# Patient Record
Sex: FEMALE
[De-identification: names, ages, dates, MRNs, and addresses within clinical notes are randomized per-mention and may not be internally consistent; named-entity substitution may affect disease eponyms.]

---

## 2017-06-12 ENCOUNTER — HOSPITAL ENCOUNTER (EMERGENCY)
Dept: HOSPITAL 14 - H.ER | Age: 27
Discharge: HOME | End: 2017-06-12
Payer: COMMERCIAL

## 2017-06-12 VITALS
DIASTOLIC BLOOD PRESSURE: 69 MMHG | OXYGEN SATURATION: 99 % | HEART RATE: 79 BPM | TEMPERATURE: 98.1 F | SYSTOLIC BLOOD PRESSURE: 115 MMHG | RESPIRATION RATE: 16 BRPM

## 2017-06-12 DIAGNOSIS — D64.9: ICD-10-CM

## 2017-06-12 DIAGNOSIS — K21.9: Primary | ICD-10-CM

## 2017-06-12 LAB
ALBUMIN/GLOB SERPL: 1.4 {RATIO} (ref 1–2.1)
ALP SERPL-CCNC: 64 U/L (ref 38–126)
ALT SERPL-CCNC: 33 U/L (ref 9–52)
APTT BLD: 27.8 SECONDS (ref 25.6–37.1)
AST SERPL-CCNC: 29 U/L (ref 14–36)
BASOPHILS # BLD AUTO: 0 K/UL (ref 0–0.2)
BASOPHILS NFR BLD: 0.6 % (ref 0–2)
BILIRUB SERPL-MCNC: 0.2 MG/DL (ref 0.2–1.3)
BILIRUB UR-MCNC: NEGATIVE MG/DL
BUN SERPL-MCNC: 10 MG/DL (ref 7–17)
CALCIUM SERPL-MCNC: 9.4 MG/DL (ref 8.4–10.2)
CHLORIDE SERPL-SCNC: 104 MMOL/L (ref 98–107)
CO2 SERPL-SCNC: 27 MMOL/L (ref 22–30)
COLOR UR: YELLOW
EOSINOPHIL # BLD AUTO: 0.2 K/UL (ref 0–0.7)
EOSINOPHIL NFR BLD: 2.9 % (ref 0–4)
ERYTHROCYTE [DISTWIDTH] IN BLOOD BY AUTOMATED COUNT: 13.7 % (ref 11.5–14.5)
GLOBULIN SER-MCNC: 3.2 GM/DL (ref 2.2–3.9)
GLUCOSE SERPL-MCNC: 79 MG/DL (ref 65–105)
GLUCOSE UR STRIP-MCNC: (no result) MG/DL
HCT VFR BLD CALC: 32.4 % (ref 34–47)
KETONES UR STRIP-MCNC: NEGATIVE MG/DL
LEUKOCYTE ESTERASE UR-ACNC: (no result) LEU/UL
LIPASE SERPL-CCNC: 68 U/L (ref 23–300)
LYMPHOCYTES # BLD AUTO: 2.1 K/UL (ref 1–4.3)
LYMPHOCYTES NFR BLD AUTO: 33 % (ref 20–40)
MCH RBC QN AUTO: 22.6 PG (ref 27–31)
MCHC RBC AUTO-ENTMCNC: 32.1 G/DL (ref 33–37)
MCV RBC AUTO: 70.4 FL (ref 81–99)
MONOCYTES # BLD: 0.8 K/UL (ref 0–0.8)
MONOCYTES NFR BLD: 12.5 % (ref 0–10)
NEUTROPHILS # BLD: 3.3 K/UL (ref 1.8–7)
NEUTROPHILS NFR BLD AUTO: 51 % (ref 50–75)
NRBC BLD AUTO-RTO: 0 % (ref 0–0)
PH UR STRIP: 6 [PH] (ref 5–8)
PLATELET # BLD: 220 K/UL (ref 130–400)
PMV BLD AUTO: 9.4 FL (ref 7.2–11.7)
POTASSIUM SERPL-SCNC: 3.8 MMOL/L (ref 3.6–5)
PROT SERPL-MCNC: 7.7 G/DL (ref 6.3–8.2)
PROT UR STRIP-MCNC: NEGATIVE MG/DL
RBC # UR STRIP: NEGATIVE /UL
RBC #/AREA URNS HPF: 3 /HPF (ref 0–3)
SODIUM SERPL-SCNC: 142 MMOL/L (ref 132–148)
SP GR UR STRIP: 1.02 (ref 1–1.03)
UROBILINOGEN UR-MCNC: (no result) MG/DL (ref 0.2–1)
WBC # BLD AUTO: 6.5 K/UL (ref 4.8–10.8)
WBC #/AREA URNS HPF: 1 /HPF (ref 0–5)

## 2017-06-12 NOTE — CARD
--------------- APPROVED REPORT --------------





EKG Measurement

Heart [id number]

OH [id number]

[id number]

[id number]

[id number]



<Conclusion>

Normal sinus rhythm

Normal ECG

## 2017-06-12 NOTE — RAD
HISTORY:

CP  



COMPARISON:

No prior.



TECHNIQUE:

Chest PA and lateral



FINDINGS:



LUNGS:

No active pulmonary disease.



PLEURA:

No significant pleural effusion identified. No pneumothorax apparent.



CARDIOVASCULAR:

Normal.



OSSEOUS STRUCTURES:

No significant abnormalities.



VISUALIZED UPPER ABDOMEN:

Normal.



OTHER FINDINGS:

None.



IMPRESSION:

No active disease.

## 2017-06-12 NOTE — ED PDOC
HPI: Abdomen


Time Seen by Provider: 06/12/17 07:39


Chief Complaint (Nursing): Chest Pain


Chief Complaint (Provider): abdominal pain 


History Per: Patient


History/Exam Limitations: no limitations


Onset/Duration Of Symptoms: Days (x 4)


Outside of [place] travel?: No


Quality Of Discomfort: Burning


Additional Complaint(s): 





Fatemeh Adrian is a 26 year old female, with no previou smedical history, who 

presents to the ED with complaints of abdominal burning associated with chest 

tightness that radiates to her throat, nausea and 4 episodes of vomiting 

ongoing for the past 4 days. Patient denies any shortness of breath, fever or 

chills. She reports pain usually occurs at night and early morning but denies 

exacerbation of symptoms while laying flat. 





PMD: none provided 





Past Medical History


Reviewed: Historical Data, Nursing Documentation, Vital Signs


Vital Signs: 


 Last Vital Signs











Temp  98.1 F   06/12/17 13:18


 


Pulse  79   06/12/17 13:18


 


Resp  16   06/12/17 13:18


 


BP  115/69   06/12/17 13:18


 


Pulse Ox  99   06/12/17 13:18














- Medical History


PMH: Anemia





- Surgical History


Surgical History: Appendectomy





- Family History


Family History: States: Unknown Family Hx





- Home Medications


Home Medications: 


 Ambulatory Orders











 Medication  Instructions  Recorded


 


Ferrous Sulfate [Feosol] 324 mg PO TID #15 ect 06/12/17


 


Omeprazole 20 mg PO DAILY #20 tablet. 06/12/17


 


Ondansetron ODT [Zofran ODT] 4 mg PO Q8H PRN #20 odt 06/12/17














- Allergies


Allergies/Adverse Reactions: 


 Allergies











Allergy/AdvReac Type Severity Reaction Status Date / Time


 


No Known Allergies Allergy   Verified 06/12/17 07:30














Review of Systems


ROS Statement: Except As Marked, All Systems Reviewed And Found Negative


Constitutional: Negative for: Fever, Chills


ENT: Positive for: Throat Pain


Cardiovascular: Positive for: Chest Pain (tightness)


Gastrointestinal: Positive for: Nausea, Vomiting, Abdominal Pain (burning 

sensation )





Physical Exam





- Reviewed


Nursing Documentation Reviewed: Yes


Vital Signs Reviewed: Yes





- Physical Exam


Appears: Positive for: Well, Non-toxic, No Acute Distress


Head Exam: Positive for: ATRAUMATIC, NORMAL INSPECTION, NORMOCEPHALIC


Skin: Positive for: Normal Color, Warm, DRY


Eye Exam: Positive for: EOMI, Normal appearance, PERRL


ENT: Positive for: Normal ENT Inspection


Neck: Positive for: Normal, Painless ROM


Cardiovascular/Chest: Positive for: Regular Rate, Rhythm


Respiratory: Positive for: CNT, Normal Breath Sounds


Gastrointestinal/Abdominal: Positive for: Normal Exam, Bowel Sounds, Soft.  

Negative for: Tenderness


Back: Positive for: Normal Inspection


Extremity: Positive for: Normal ROM.  Negative for: Calf Tenderness


Neurologic/Psych: Positive for: Alert, Oriented





- Laboratory Results


Result Diagrams: 


 06/12/17 06:30





 06/12/17 06:30





- ECG


Interpretation Of ECG: 





NSR @ 96, no ST-T changes.





- Radiology


X-Ray: Read By Radiologist


X-Ray Interpretation: No Acute Disease





Medical Decision Making


Medical Decision Making: 





Initial Impression: Throat and Chest pain 





Initial Plan:


* EKG


* labs 


* lipase


* urine dipstick 


* urinalysis


* partial thromboplastin time 


* prothrombin time 


* morphine 2 mg IV


* IV NS 1,000 ml at 1,000 ml/hr 


* zofran 4 mg IV 


* reevaluation 





--------------------------------------------------------------------------------

-----------------


Scribe Attestation:


Documented by Kelly Miller, acting as a scribe for Kelly Kelly MD.





Provider Scribe Attestation:


All medical record entries made by the Scribe were at my direction and 

personally dictated by me. I have reviewed the chart and agree that the record 

accurately reflects my personal performance of the history, physical exam, 

medical decision making, and the department course for this patient. I have 

also personally directed, reviewed, and agree with the discharge instructions 

and disposition.





Disposition





- Clinical Impression


Clinical Impression: 


 GERD (gastroesophageal reflux disease), Anemia








- Disposition


Referrals: 


Ruby WHEELER,MD Jeanine [Medical Doctor] - 


Disposition: Routine/Home


Disposition Time: 12:12


Condition: STABLE


Prescriptions: 


Ferrous Sulfate [Feosol] 324 mg PO TID #15 ect


Omeprazole 20 mg PO DAILY #20 tablet.


Ondansetron ODT [Zofran ODT] 4 mg PO Q8H PRN #20 odt


 PRN Reason: Nausea/Vomiting


Instructions:  Gastroesophageal Reflux Disease (ED)


Forms:  [facility] (English)

## 2018-05-06 ENCOUNTER — HOSPITAL ENCOUNTER (EMERGENCY)
Dept: HOSPITAL 14 - H.ER | Age: 28
Discharge: LEFT BEFORE DISCHARGE | End: 2018-05-06
Payer: COMMERCIAL

## 2018-05-06 VITALS
HEART RATE: 67 BPM | DIASTOLIC BLOOD PRESSURE: 54 MMHG | TEMPERATURE: 98 F | SYSTOLIC BLOOD PRESSURE: 101 MMHG | RESPIRATION RATE: 18 BRPM | OXYGEN SATURATION: 100 %

## 2018-05-06 VITALS — BODY MASS INDEX: 24.2 KG/M2

## 2018-05-06 DIAGNOSIS — Z02.89: Primary | ICD-10-CM
